# Patient Record
Sex: MALE | Race: WHITE | NOT HISPANIC OR LATINO
[De-identification: names, ages, dates, MRNs, and addresses within clinical notes are randomized per-mention and may not be internally consistent; named-entity substitution may affect disease eponyms.]

---

## 2017-08-16 PROBLEM — Z00.00 ENCOUNTER FOR PREVENTIVE HEALTH EXAMINATION: Status: ACTIVE | Noted: 2017-08-16

## 2017-09-15 ENCOUNTER — APPOINTMENT (OUTPATIENT)
Dept: ENDOCRINOLOGY | Facility: CLINIC | Age: 52
End: 2017-09-15
Payer: COMMERCIAL

## 2017-09-15 VITALS
SYSTOLIC BLOOD PRESSURE: 118 MMHG | BODY MASS INDEX: 33.9 KG/M2 | WEIGHT: 216 LBS | DIASTOLIC BLOOD PRESSURE: 80 MMHG | OXYGEN SATURATION: 98 % | HEIGHT: 67 IN | HEART RATE: 71 BPM

## 2017-09-15 DIAGNOSIS — Z82.49 FAMILY HISTORY OF ISCHEMIC HEART DISEASE AND OTHER DISEASES OF THE CIRCULATORY SYSTEM: ICD-10-CM

## 2017-09-15 DIAGNOSIS — Z87.891 PERSONAL HISTORY OF NICOTINE DEPENDENCE: ICD-10-CM

## 2017-09-15 DIAGNOSIS — Z83.3 FAMILY HISTORY OF DIABETES MELLITUS: ICD-10-CM

## 2017-09-15 PROCEDURE — 99205 OFFICE O/P NEW HI 60 MIN: CPT

## 2017-09-15 RX ORDER — QUINAPRIL HYDROCHLORIDE 40 MG/1
40 TABLET, FILM COATED ORAL
Refills: 0 | Status: DISCONTINUED | COMMUNITY
Start: 2017-02-15 | End: 2017-09-15

## 2017-09-15 RX ORDER — TESTOSTERONE CYPIONATE 200 MG/ML
200 INJECTION, SOLUTION INTRAMUSCULAR
Qty: 6 | Refills: 0 | Status: DISCONTINUED | COMMUNITY
Start: 2017-09-15 | End: 2017-09-15

## 2017-10-26 ENCOUNTER — RESULT REVIEW (OUTPATIENT)
Age: 52
End: 2017-10-26

## 2017-10-27 ENCOUNTER — RESULT REVIEW (OUTPATIENT)
Age: 52
End: 2017-10-27

## 2018-01-12 ENCOUNTER — RX RENEWAL (OUTPATIENT)
Age: 53
End: 2018-01-12

## 2018-01-26 ENCOUNTER — OTHER (OUTPATIENT)
Age: 53
End: 2018-01-26

## 2018-01-26 ENCOUNTER — APPOINTMENT (OUTPATIENT)
Dept: ENDOCRINOLOGY | Facility: CLINIC | Age: 53
End: 2018-01-26
Payer: COMMERCIAL

## 2018-01-26 VITALS
OXYGEN SATURATION: 98 % | WEIGHT: 208 LBS | HEIGHT: 67 IN | DIASTOLIC BLOOD PRESSURE: 72 MMHG | SYSTOLIC BLOOD PRESSURE: 118 MMHG | HEART RATE: 58 BPM | BODY MASS INDEX: 32.65 KG/M2

## 2018-01-26 PROCEDURE — 99214 OFFICE O/P EST MOD 30 MIN: CPT

## 2018-01-30 RX ORDER — TESTOSTERONE ENANTHATE 200 MG/ML
200 INJECTION, SOLUTION INTRAMUSCULAR
Refills: 0 | Status: DISCONTINUED | COMMUNITY
Start: 2017-09-15 | End: 2018-01-30

## 2018-03-05 ENCOUNTER — RX RENEWAL (OUTPATIENT)
Age: 53
End: 2018-03-05

## 2018-08-07 ENCOUNTER — RX RENEWAL (OUTPATIENT)
Age: 53
End: 2018-08-07

## 2018-08-09 ENCOUNTER — RX RENEWAL (OUTPATIENT)
Age: 53
End: 2018-08-09

## 2018-08-10 ENCOUNTER — RX RENEWAL (OUTPATIENT)
Age: 53
End: 2018-08-10

## 2018-08-29 ENCOUNTER — APPOINTMENT (OUTPATIENT)
Dept: ENDOCRINOLOGY | Facility: CLINIC | Age: 53
End: 2018-08-29

## 2018-10-16 ENCOUNTER — OTHER (OUTPATIENT)
Age: 53
End: 2018-10-16

## 2019-01-18 ENCOUNTER — APPOINTMENT (OUTPATIENT)
Dept: ENDOCRINOLOGY | Facility: CLINIC | Age: 54
End: 2019-01-18
Payer: COMMERCIAL

## 2019-01-18 VITALS
HEIGHT: 67 IN | BODY MASS INDEX: 32.65 KG/M2 | HEART RATE: 98 BPM | DIASTOLIC BLOOD PRESSURE: 80 MMHG | SYSTOLIC BLOOD PRESSURE: 120 MMHG | OXYGEN SATURATION: 98 % | WEIGHT: 208 LBS

## 2019-01-18 PROCEDURE — 99214 OFFICE O/P EST MOD 30 MIN: CPT

## 2019-01-18 RX ORDER — ALENDRONATE SODIUM 70 MG/1
70 TABLET ORAL
Qty: 5 | Refills: 4 | Status: DISCONTINUED | COMMUNITY
Start: 2018-01-26 | End: 2019-01-18

## 2019-01-18 RX ORDER — OMEPRAZOLE MAGNESIUM 20 MG/1
20 TABLET, DELAYED RELEASE ORAL
Refills: 0 | Status: DISCONTINUED | COMMUNITY
Start: 2017-09-15 | End: 2019-01-18

## 2019-01-18 RX ORDER — NEBIVOLOL HYDROCHLORIDE 10 MG/1
10 TABLET ORAL DAILY
Refills: 0 | Status: DISCONTINUED | COMMUNITY
Start: 2017-03-22 | End: 2019-01-18

## 2019-01-18 RX ORDER — PREDNISONE 10 MG/1
10 TABLET ORAL
Refills: 0 | Status: DISCONTINUED | COMMUNITY
Start: 2018-01-30 | End: 2019-01-18

## 2019-01-20 NOTE — ASSESSMENT
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 Kallmann syndrome - will request recent labs. .   Will continue replacement therapy of testosterone cypionate 150 mg q 2 weeks and adjust as indicated. PSA 1.1\par \par 2. Osteoporosis - repeat DEXA now. never started fosamax but reports compliance with testosterone therapy. today. recommend calcium 500 mg qd. \par \par 3. Hashimoto's thyroiditis -  Continue stable dose of LT4 100 mcg poqd and adjust as indicated. referred for thyroid US\par \par 4. HTN - continue quinapril \par \par 5. HLD - continue simvastatin. \par \par 6. Prediabetes - diagnosed 10/2017 with a HbA1c 5.9. Lifestyle modification advised. \par \par RV 4 months.

## 2019-01-20 NOTE — DATA REVIEWED
[FreeTextEntry1] : 10/17/17\par Hct 42.9\par eGFR 94\par Cr 0.93\par corrected calcium 9.1\par Tot Chol 184\par \par HDL 40\par Tri 182\par FSH 1.0\par LH 0.6\par Tot Testo 630\par PSA 1.1\par SHBG 45.1\par TPO Abs 27\par TG Ab 3.2\par TSH 1.27\par FT4 1.19\par HbA1c 5.9\par

## 2019-01-20 NOTE — HISTORY OF PRESENT ILLNESS
[FreeTextEntry1] : 52 yo M with Kallmann syndrome, Hashimotos thyroiditis, osteoporosis, HTN, HLD,prediabetes presents for initial evaluation.\par \par Kallmann Syndrome:\par Due to short stature and delayed puberty his brother was evaluated and found to have Kallmann syndrome\par The patient was then taken for evaluation and was diagnosed when 12 yrs old.\par He has been taking testosterone since diagnosis except during desire for reproduction at which time he was reportedly treated with clomid and HCG.\par Genetic testing was reportedly done by repro endo Dr. Bermudez (sp?), who he reports is no longer reachable. The patient can not comment on the results.\par He has one child\par Further clinical manifestations of anosmia, cryptorchidism, cleft palate (multiple surgeries for latter first when was 18 days old)\par He was previously taking IM Testosterone cypionate 200 mg IM q 3 weeks (he reports that he uses his brother's medication).\par Was treated for acute hearing loss of the left ear from approximately 1/5/18 -2/2018. Now resolved\par Evaluated by Dr. Montiel. \par ENT Dr. Heather Marcus [phone]\par He was placed on steroid taper starting with prednisone 100 mg qd started 1/23/18. \par He was changed to testosterone cypionate  mg q 2 weeks on 10/27/17 He was previously non compliant with testosterone replacement - 10 yrs ago there was a time when he did not take it for one year, and there was a time for at least 2 yrs where he took it sporadically.\par DEXA checked after this non-compliance revealed osteoporosis. \par Repeat 9/22/17 DEXA showed osteoporosis of the lumbar spine and osteopenia of the hip. Improvement in BMD noted after 2 years of consistent testosterone therapy however given T score of < -2.5 therapy was recommended. Prolia was not covered by his insurance. He was started on fosamax but never took it. \par prostate exam 10/6/17 by urologist Dr. James Aden (consult reviewed)\par Has never had abdominal imaging to evaluate for renal agenesis. \par \par Osteoporosis\par DEXA as above\par No history of fractures\par Prior smoker 1 ppd x 7 years, quit over 10 yrs ago. \par \par Hashimoto's thyroiditis\par Dx at least 10 yrs ago\par On stable dose of LT4 100 mcg qd.\par No history of thyroid surgery or radiation to the head or neck\par Never told of thyroid nodules\par Mother, father and brother with hypothyroidism\par

## 2019-01-22 ENCOUNTER — OTHER (OUTPATIENT)
Age: 54
End: 2019-01-22

## 2019-01-22 ENCOUNTER — TRANSCRIPTION ENCOUNTER (OUTPATIENT)
Age: 54
End: 2019-01-22

## 2019-01-22 ENCOUNTER — RX RENEWAL (OUTPATIENT)
Age: 54
End: 2019-01-22

## 2019-01-22 DIAGNOSIS — Z82.49 FAMILY HISTORY OF ISCHEMIC HEART DISEASE AND OTHER DISEASES OF THE CIRCULATORY SYSTEM: ICD-10-CM

## 2019-02-09 ENCOUNTER — RESULT REVIEW (OUTPATIENT)
Age: 54
End: 2019-02-09

## 2019-02-16 ENCOUNTER — RESULT REVIEW (OUTPATIENT)
Age: 54
End: 2019-02-16

## 2019-03-18 ENCOUNTER — TRANSCRIPTION ENCOUNTER (OUTPATIENT)
Age: 54
End: 2019-03-18

## 2019-06-12 ENCOUNTER — OTHER (OUTPATIENT)
Age: 54
End: 2019-06-12

## 2019-08-09 ENCOUNTER — RX RENEWAL (OUTPATIENT)
Age: 54
End: 2019-08-09

## 2019-08-15 ENCOUNTER — RESULT REVIEW (OUTPATIENT)
Age: 54
End: 2019-08-15

## 2019-11-22 ENCOUNTER — APPOINTMENT (OUTPATIENT)
Dept: INTERNAL MEDICINE | Facility: CLINIC | Age: 54
End: 2019-11-22
Payer: COMMERCIAL

## 2019-11-22 VITALS
RESPIRATION RATE: 14 BRPM | OXYGEN SATURATION: 97 % | HEART RATE: 102 BPM | HEIGHT: 65.75 IN | WEIGHT: 220.46 LBS | BODY MASS INDEX: 35.86 KG/M2 | SYSTOLIC BLOOD PRESSURE: 110 MMHG | DIASTOLIC BLOOD PRESSURE: 70 MMHG | TEMPERATURE: 98.3 F

## 2019-11-22 DIAGNOSIS — R10.11 RIGHT UPPER QUADRANT PAIN: ICD-10-CM

## 2019-11-22 DIAGNOSIS — R40.0 SOMNOLENCE: ICD-10-CM

## 2019-11-22 PROCEDURE — 99212 OFFICE O/P EST SF 10 MIN: CPT | Mod: 25

## 2019-11-22 PROCEDURE — 99202 OFFICE O/P NEW SF 15 MIN: CPT | Mod: 25

## 2019-11-22 PROCEDURE — G0444 DEPRESSION SCREEN ANNUAL: CPT | Mod: 59

## 2019-11-22 PROCEDURE — 99386 PREV VISIT NEW AGE 40-64: CPT | Mod: 25

## 2019-11-22 NOTE — COUNSELING
[Potential consequences of obesity discussed] : Potential consequences of obesity discussed [Encouraged to maintain food diary] : Encouraged to maintain food diary [Encouraged to increase physical activity] : Encouraged to increase physical activity [Target Wt Loss Goal ___] : Weight Loss Goals: Target weight loss goal [unfilled] lbs

## 2019-11-27 ENCOUNTER — APPOINTMENT (OUTPATIENT)
Dept: ENDOCRINOLOGY | Facility: CLINIC | Age: 54
End: 2019-11-27

## 2019-12-06 ENCOUNTER — APPOINTMENT (OUTPATIENT)
Dept: ENDOCRINOLOGY | Facility: CLINIC | Age: 54
End: 2019-12-06

## 2020-02-21 ENCOUNTER — APPOINTMENT (OUTPATIENT)
Dept: UROLOGY | Facility: CLINIC | Age: 55
End: 2020-02-21
Payer: COMMERCIAL

## 2020-02-21 VITALS — DIASTOLIC BLOOD PRESSURE: 94 MMHG | SYSTOLIC BLOOD PRESSURE: 150 MMHG | HEART RATE: 59 BPM

## 2020-02-21 VITALS — BODY MASS INDEX: 33.15 KG/M2 | WEIGHT: 199 LBS | HEIGHT: 65 IN

## 2020-02-21 PROCEDURE — 99204 OFFICE O/P NEW MOD 45 MIN: CPT

## 2020-02-21 NOTE — HISTORY OF PRESENT ILLNESS
[FreeTextEntry1] : Very pleasant 54-year-old gentleman presents for evaluation of left flank pain and left kidney stone. Patient recently underwent a renal ultrasound for left flank pain which demonstrated mild hydronephrosis secondary to presumed 1 cm kidney stone. He reports persistent flank pain. He has not used pain medication. He denies dysuria. No hematuria. No right flank pain. No pain radiation. No specific timing to his symptoms. No other complaints. [Flank Pain] : flank pain

## 2020-02-21 NOTE — ASSESSMENT
[FreeTextEntry1] : Very pleasant 54-year-old gentleman who presents for evaluation of kidney stones\par -Renal ultrasound images from [facility] radiology reviewed\par -CT scan for definitive diagnosis and surgical planning\par -We discussed different treatment options for a 1 cm kidney stone\par -Urine culture\par -Follow up next week\par -Ibuprofen and Tylenol for pain

## 2020-02-21 NOTE — PHYSICAL EXAM
[General Appearance - Well Developed] : well developed [General Appearance - Well Nourished] : well nourished [Well Groomed] : well groomed [Normal Appearance] : normal appearance [General Appearance - In No Acute Distress] : no acute distress [Edema] : no peripheral edema [Respiration, Rhythm And Depth] : normal respiratory rhythm and effort [Exaggerated Use Of Accessory Muscles For Inspiration] : no accessory muscle use [Abdomen Soft] : soft [Abdomen Tenderness] : non-tender [Urethral Meatus] : meatus normal [Costovertebral Angle Tenderness] : no ~M costovertebral angle tenderness [Scrotum] : the scrotum was normal [Urinary Bladder Findings] : the bladder was normal on palpation [No Prostate Nodules] : no prostate nodules [Testes Mass (___cm)] : there were no testicular masses [] : no rash [Normal Station and Gait] : the gait and station were normal for the patient's age [No Focal Deficits] : no focal deficits [Oriented To Time, Place, And Person] : oriented to person, place, and time [Affect] : the affect was normal [Mood] : the mood was normal [Not Anxious] : not anxious [No Palpable Adenopathy] : no palpable adenopathy

## 2020-02-24 LAB — BACTERIA UR CULT: NORMAL

## 2020-02-25 ENCOUNTER — APPOINTMENT (OUTPATIENT)
Dept: UROLOGY | Facility: CLINIC | Age: 55
End: 2020-02-25
Payer: COMMERCIAL

## 2020-02-25 ENCOUNTER — APPOINTMENT (OUTPATIENT)
Dept: INTERNAL MEDICINE | Facility: CLINIC | Age: 55
End: 2020-02-25
Payer: COMMERCIAL

## 2020-02-25 ENCOUNTER — NON-APPOINTMENT (OUTPATIENT)
Age: 55
End: 2020-02-25

## 2020-02-25 VITALS
WEIGHT: 199 LBS | RESPIRATION RATE: 15 BRPM | HEIGHT: 65 IN | HEART RATE: 75 BPM | BODY MASS INDEX: 33.15 KG/M2 | SYSTOLIC BLOOD PRESSURE: 138 MMHG | DIASTOLIC BLOOD PRESSURE: 89 MMHG

## 2020-02-25 VITALS
TEMPERATURE: 98.5 F | HEART RATE: 99 BPM | WEIGHT: 199.3 LBS | BODY MASS INDEX: 32.03 KG/M2 | OXYGEN SATURATION: 97 % | DIASTOLIC BLOOD PRESSURE: 82 MMHG | HEIGHT: 66 IN | SYSTOLIC BLOOD PRESSURE: 125 MMHG

## 2020-02-25 DIAGNOSIS — N40.0 BENIGN PROSTATIC HYPERPLASIA WITHOUT LOWER URINARY TRACT SYMPMS: ICD-10-CM

## 2020-02-25 DIAGNOSIS — Z01.818 ENCOUNTER FOR OTHER PREPROCEDURAL EXAMINATION: ICD-10-CM

## 2020-02-25 PROCEDURE — 36415 COLL VENOUS BLD VENIPUNCTURE: CPT

## 2020-02-25 PROCEDURE — 99214 OFFICE O/P EST MOD 30 MIN: CPT

## 2020-02-25 PROCEDURE — 99214 OFFICE O/P EST MOD 30 MIN: CPT | Mod: 25

## 2020-02-25 NOTE — PHYSICAL EXAM
[General Appearance - Well Developed] : well developed [General Appearance - Well Nourished] : well nourished [Normal Appearance] : normal appearance [Well Groomed] : well groomed [General Appearance - In No Acute Distress] : no acute distress [Abdomen Tenderness] : non-tender [Abdomen Soft] : soft [FreeTextEntry1] : left flank pain [Urinary Bladder Findings] : the bladder was normal on palpation [Edema] : no peripheral edema [Respiration, Rhythm And Depth] : normal respiratory rhythm and effort [] : no respiratory distress [Exaggerated Use Of Accessory Muscles For Inspiration] : no accessory muscle use [Oriented To Time, Place, And Person] : oriented to person, place, and time [Affect] : the affect was normal [Mood] : the mood was normal [Normal Station and Gait] : the gait and station were normal for the patient's age [No Focal Deficits] : no focal deficits [Not Anxious] : not anxious [No Palpable Adenopathy] : no palpable adenopathy

## 2020-02-25 NOTE — ASSESSMENT
[FreeTextEntry1] : Very pleasant 54-year-old gentleman who presents for followup of left flank pain, new finding of left intrarenal stone on CT scan\par -Urine culture negative\par -I discussed the different treatment modalities for nephrolithiasis with the patient, including medical management, spontaneous stone passage, percutaneous stone extraction, extracorporeal shock wave lithotripsy, and ureteroscopy with laser lithotripsy and stone extraction. Given the size and location of the stone, the patient opted to proceed with ureteroscopy.  The risks, benefits, and alternatives to ureteroscopy were discussed with the patient, including but not limited to pain, infection, bleeding, bladder injury, ureteral injury, renal injury, and treatment failure.  I also discussed the possible need for a temporary ureteral stent.  I discussed the possible side effects of a temporary ureteral stent, including flank pain, hematuria, and bladder spasms.  The patient understands that a stent is a temporary implant that must be removed in the future.  The patient wishes to proceed and we will schedule the surgery for the near future.\par -Medical clearance

## 2020-02-25 NOTE — HISTORY OF PRESENT ILLNESS
[FreeTextEntry1] : Very pleasant 54-year-old gentleman who presents for followup of left kidney stone and left flank pain. Patient recently underwent CT scan at [facility] radiology which demonstrated an approximately 1.1 cm left intrarenal stone with mild hydronephrosis. He still reports left flank pain. He reports taking children's Tylenol recently which significantly improved his pain. He denies dysuria. No hematuria. No right flank pain. No pain radiation. No other complaints. [Flank Pain] : flank pain

## 2020-02-27 NOTE — ASSESSMENT
[FreeTextEntry1] : 1) Annual Physical: Patient UTD with regular eye care and also dentist. Discussed healthy eating with patient and limiting calories, continue with exercise\par 2) Daytime somnolence: suspect sleep apnea given history, advised weight loss and rto in 3 months. If no improvement to have sleep study\par 3) HTN: stable, continue current management\par 4) HLD: awaiting results from endocrinologist, to have results sent over\par 5) BPH: awaiting results from endocrinologist, check PSA as patient is on TRT\par 6) RUQ abdominal pain: will await results of liver function done by endocrinologist. check abdominal U/S\par 7) Vitamin D insufficiency: check levels, awaiting results from endocrinologist

## 2020-02-27 NOTE — HISTORY OF PRESENT ILLNESS
[FreeTextEntry1] : 54 year old male presents for annual physical and to establish care [de-identified] : 40-year-old male with past medical history significant for kallman syndrome, low testosterone hypothyroidism hypertension hyperlipidemia presents to establish care. Patient has seen a cardiologist the last 2 years denies any cardiac symptoms including chest pain chest tightness shortness of breath. Patient walks constantly daily denies any claudication like symptoms or any chest pain. Patient does feel un refreshed from sleep and also does feel daytime somnolence. Patient does have witnessed snoring episodes. Patient denies any urinary incontinence. Patient denies any hesitancy or any obstructive urinary symptoms. Last colonoscopy done this year. Left ear fullness , treated with triamterene diagnosed with endolymphic hydrops.

## 2020-02-27 NOTE — PHYSICAL EXAM
[Prostate Enlarged] : was enlarged [Normal] : affect was normal and insight and judgment were intact [Prostate Nodule] : did not have a nodule [Prostate Tenderness] : was not tender [de-identified] : RUQ tenderness, ventura's sign negative

## 2020-02-28 ENCOUNTER — OUTPATIENT (OUTPATIENT)
Dept: OUTPATIENT SERVICES | Facility: HOSPITAL | Age: 55
LOS: 1 days | End: 2020-02-28
Payer: COMMERCIAL

## 2020-02-28 VITALS
DIASTOLIC BLOOD PRESSURE: 86 MMHG | TEMPERATURE: 98 F | SYSTOLIC BLOOD PRESSURE: 136 MMHG | OXYGEN SATURATION: 99 % | WEIGHT: 199.08 LBS | RESPIRATION RATE: 18 BRPM | HEIGHT: 66 IN | HEART RATE: 67 BPM

## 2020-02-28 DIAGNOSIS — N20.0 CALCULUS OF KIDNEY: ICD-10-CM

## 2020-02-28 DIAGNOSIS — Z87.730 PERSONAL HISTORY OF (CORRECTED) CLEFT LIP AND PALATE: Chronic | ICD-10-CM

## 2020-02-28 DIAGNOSIS — I10 ESSENTIAL (PRIMARY) HYPERTENSION: ICD-10-CM

## 2020-02-28 DIAGNOSIS — Z01.818 ENCOUNTER FOR OTHER PREPROCEDURAL EXAMINATION: ICD-10-CM

## 2020-02-28 DIAGNOSIS — E06.3 AUTOIMMUNE THYROIDITIS: ICD-10-CM

## 2020-02-28 DIAGNOSIS — Z98.890 OTHER SPECIFIED POSTPROCEDURAL STATES: Chronic | ICD-10-CM

## 2020-02-28 DIAGNOSIS — E78.5 HYPERLIPIDEMIA, UNSPECIFIED: ICD-10-CM

## 2020-02-28 LAB
ALBUMIN SERPL ELPH-MCNC: 4.3 G/DL
ALP BLD-CCNC: 78 U/L
ALT SERPL-CCNC: 16 U/L
ANION GAP SERPL CALC-SCNC: 17 MMOL/L
APPEARANCE UR: CLEAR — SIGNIFICANT CHANGE UP
APTT BLD: 28.6 SEC
AST SERPL-CCNC: 19 U/L
BACTERIA # UR AUTO: ABNORMAL /HPF
BASOPHILS # BLD AUTO: 0.04 K/UL
BASOPHILS NFR BLD AUTO: 0.5 %
BILIRUB SERPL-MCNC: 0.3 MG/DL
BILIRUB UR-MCNC: NEGATIVE — SIGNIFICANT CHANGE UP
BUN SERPL-MCNC: 20 MG/DL
CALCIUM SERPL-MCNC: 9.6 MG/DL
CHLORIDE SERPL-SCNC: 100 MMOL/L
CO2 SERPL-SCNC: 23 MMOL/L
COLOR SPEC: YELLOW — SIGNIFICANT CHANGE UP
COMMENT - URINE: SIGNIFICANT CHANGE UP
CREAT SERPL-MCNC: 1.04 MG/DL
DIFF PNL FLD: ABNORMAL
EOSINOPHIL # BLD AUTO: 0.11 K/UL
EOSINOPHIL NFR BLD AUTO: 1.4 %
EPI CELLS # UR: SIGNIFICANT CHANGE UP /HPF
ESTIMATED AVERAGE GLUCOSE: 137 MG/DL
GLUCOSE SERPL-MCNC: 128 MG/DL
GLUCOSE UR QL: NEGATIVE — SIGNIFICANT CHANGE UP
HBA1C MFR BLD HPLC: 6.4 %
HCT VFR BLD CALC: 41.6 %
HGB BLD-MCNC: 14.3 G/DL
HYALINE CASTS # UR AUTO: ABNORMAL /LPF
IMM GRANULOCYTES NFR BLD AUTO: 0.6 %
INR PPP: 0.97 RATIO
KETONES UR-MCNC: NEGATIVE — SIGNIFICANT CHANGE UP
LEUKOCYTE ESTERASE UR-ACNC: NEGATIVE — SIGNIFICANT CHANGE UP
LYMPHOCYTES # BLD AUTO: 1.98 K/UL
LYMPHOCYTES NFR BLD AUTO: 24.9 %
MAN DIFF?: NORMAL
MCHC RBC-ENTMCNC: 30.4 PG
MCHC RBC-ENTMCNC: 34.4 GM/DL
MCV RBC AUTO: 88.5 FL
MONOCYTES # BLD AUTO: 0.6 K/UL
MONOCYTES NFR BLD AUTO: 7.6 %
NEUTROPHILS # BLD AUTO: 5.16 K/UL
NEUTROPHILS NFR BLD AUTO: 65 %
NITRITE UR-MCNC: NEGATIVE — SIGNIFICANT CHANGE UP
PH UR: 5 — SIGNIFICANT CHANGE UP (ref 5–8)
PLATELET # BLD AUTO: 302 K/UL
POTASSIUM SERPL-SCNC: 3.8 MMOL/L
PROT SERPL-MCNC: 7.5 G/DL
PROT UR-MCNC: 30 MG/DL
PT BLD: 11 SEC
RBC # BLD: 4.7 M/UL
RBC # FLD: 11.9 %
RBC CASTS # UR COMP ASSIST: ABNORMAL /HPF (ref 0–2)
SODIUM SERPL-SCNC: 141 MMOL/L
SP GR SPEC: 1.02 — SIGNIFICANT CHANGE UP (ref 1.01–1.02)
TSH SERPL-ACNC: 0.87 UIU/ML
UROBILINOGEN FLD QL: NEGATIVE — SIGNIFICANT CHANGE UP
WBC # FLD AUTO: 7.94 K/UL
WBC UR QL: SIGNIFICANT CHANGE UP /HPF (ref 0–5)

## 2020-02-28 PROCEDURE — G0463: CPT

## 2020-02-28 NOTE — H&P PST ADULT - RESPIRATORY
FOLLOW UP:   - Monthly   RN visit in 1 month   PA in 2 months   Dr Lee in 3 months     LABS:     - next week - if stable then every other week    MEDICATIONS:   - Stop Cellcept (Mycophenolate)   - increase Prograf (tacrolimus) to 4 mg twice daily   - take Protonix early in the morning - may take up to twice daily if needed    MISCELLANEOUS:   - try to increase activity slowly    
detailed exam

## 2020-02-28 NOTE — HISTORY OF PRESENT ILLNESS
[No Pertinent Cardiac History] : no history of aortic stenosis, atrial fibrillation, coronary artery disease, recent myocardial infarction, or implantable device/pacemaker [No Pertinent Pulmonary History] : no history of asthma, COPD, sleep apnea, or smoking [No Adverse Anesthesia Reaction] : no adverse anesthesia reaction in self or family member [Chronic Anticoagulation] : no chronic anticoagulation [Chronic Kidney Disease] : no chronic kidney disease [Diabetes] : no diabetes [FreeTextEntry1] : ureteroscopy [FreeTextEntry3] : Dr. Barraza [FreeTextEntry4] : 54-year-old male presents for preoperative clearance for ureteroscopy. Patient overall feels well denies any chest pain chest tightness shortness of breath. Is able to walk up two flights of stairs without getting shortness of breath. Denies any history of sleep apnea or asthma. No significant bleeding history.

## 2020-02-28 NOTE — H&P PST ADULT - NSICDXPROBLEM_GEN_ALL_CORE_FT
PROBLEM DIAGNOSES  Problem: Calculus of kidney  Assessment and Plan: Cystoscopy, left ureteroscopy, laser lithotripsy with stone extraction on 03/05/2020. Preoperative instructions discussed and given to pt. Verbalized understanding of instructions given.     Problem: HTN (hypertension)  Assessment and Plan: Instructed to continue quinapril and to take it with sips of water on day of surgery.  Follow-up with PCP postop for management.     Problem: HLD (hyperlipidemia)  Assessment and Plan: Instructed pt to continue simvavastatin and to follow-up with PCP postop for lipid management     Problem: Hashimoto's thyroiditis  Assessment and Plan: Instructed to continue Synthroid and take with sips of water on day of surgery. Follow-up with provider postop for management.

## 2020-02-28 NOTE — H&P PST ADULT - RS GEN PE MLT RESP DETAILS PC
no wheezes/respirations non-labored/no subcutaneous emphysema/breath sounds equal/normal/good air movement/no chest wall tenderness/no rhonchi/no intercostal retractions/no rales/airway patent/clear to auscultation bilaterally

## 2020-02-28 NOTE — H&P PST ADULT - NSICDXPASTMEDICALHX_GEN_ALL_CORE_FT
PAST MEDICAL HISTORY:  Calculus of kidney     Hashimoto's thyroiditis     HLD (hyperlipidemia)     HTN (hypertension)     Kallmann's syndrome

## 2020-02-28 NOTE — ASSESSMENT
[High Risk Surgery - Intraperitoneal, Intrathoracic or Supringuinal Vascular Procedures] : High Risk Surgery - Intraperitoneal, Intrathoracic or Supringuinal Vascular Procedures - No (0) [Ischemic Heart Disease] : Ischemic Heart Disease - No (0) [Congestive Heart Failure] : Congestive Heart Failure - No (0) [Prior Cerebrovascular Accident or TIA] : Prior Cerebrovascular Accident or TIA - No (0) [Creatinine >= 2mg/dL (1 Point)] : Creatinine >= 2mg/dL - No (0) [Insulin-dependent Diabetic (1 Point)] : Insulin-dependent Diabetic - No (0) [Patient Optimized for Surgery] : Patient optimized for surgery

## 2020-02-28 NOTE — H&P PST ADULT - ASSESSMENT
55 yr old male with PMH of HTN, HLD, Hashimoto's thyroiditis, Kallman's syndrome presents with calculus of kidney. Pt for cystoscopy, left ureteroscopy, laser lithotripsy with stone extraction on 03/05/2020.

## 2020-02-28 NOTE — H&P PST ADULT - NSICDXFAMILYHX_GEN_ALL_CORE_FT
FAMILY HISTORY:  Family history of heart disease, parents  Family history of hyperlipidemia, siblings  Family history of hypertension, siblings  Family history of liver disease, mother  Family history of lung disease, grandparent

## 2020-02-28 NOTE — H&P PST ADULT - NEGATIVE CARDIOVASCULAR SYMPTOMS
no paroxysmal nocturnal dyspnea/no chest pain/no dyspnea on exertion/no orthopnea/no palpitations/no peripheral edema/no claudication

## 2020-02-28 NOTE — H&P PST ADULT - HISTORY OF PRESENT ILLNESS
55 yr old male with no PMH presents with c/o lower abdominal quadrant pain and flank pain due to renal stone. Denies any nausea, hematuria, dysuria. Pt for cystoscopy, ureteroscopy with laser lithotripsy 55 yr old male with PMH of HTN, HLD, Hashimoto's thyroiditis, Kallmann's syndrome presents with c/o dysuria and pressure in lower in lower back due to renal stone. Denies any nausea, hematuria, dysuria. Pt for cystoscopy, left ureteroscopy, laser lithotripsy with stone extraction on 03/05/2020.

## 2020-02-28 NOTE — PHYSICAL EXAM
[Normal] : affect was normal and insight and judgment were intact [de-identified] : left sided abdominal discomfort

## 2020-03-04 ENCOUNTER — TRANSCRIPTION ENCOUNTER (OUTPATIENT)
Age: 55
End: 2020-03-04

## 2020-03-05 ENCOUNTER — RESULT REVIEW (OUTPATIENT)
Age: 55
End: 2020-03-05

## 2020-03-05 ENCOUNTER — OUTPATIENT (OUTPATIENT)
Dept: OUTPATIENT SERVICES | Facility: HOSPITAL | Age: 55
LOS: 1 days | End: 2020-03-05
Payer: COMMERCIAL

## 2020-03-05 ENCOUNTER — APPOINTMENT (OUTPATIENT)
Dept: UROLOGY | Facility: HOSPITAL | Age: 55
End: 2020-03-05

## 2020-03-05 VITALS
HEIGHT: 66 IN | WEIGHT: 199.08 LBS | DIASTOLIC BLOOD PRESSURE: 60 MMHG | TEMPERATURE: 98 F | RESPIRATION RATE: 18 BRPM | HEART RATE: 91 BPM | SYSTOLIC BLOOD PRESSURE: 102 MMHG | OXYGEN SATURATION: 98 %

## 2020-03-05 VITALS
TEMPERATURE: 98 F | HEART RATE: 83 BPM | SYSTOLIC BLOOD PRESSURE: 129 MMHG | RESPIRATION RATE: 16 BRPM | OXYGEN SATURATION: 94 % | DIASTOLIC BLOOD PRESSURE: 72 MMHG

## 2020-03-05 DIAGNOSIS — Z87.730 PERSONAL HISTORY OF (CORRECTED) CLEFT LIP AND PALATE: Chronic | ICD-10-CM

## 2020-03-05 DIAGNOSIS — N20.0 CALCULUS OF KIDNEY: ICD-10-CM

## 2020-03-05 DIAGNOSIS — Z98.890 OTHER SPECIFIED POSTPROCEDURAL STATES: Chronic | ICD-10-CM

## 2020-03-05 PROCEDURE — 74420 UROGRAPHY RTRGR +-KUB: CPT | Mod: 26

## 2020-03-05 PROCEDURE — 82365 CALCULUS SPECTROSCOPY: CPT

## 2020-03-05 PROCEDURE — 76000 FLUOROSCOPY <1 HR PHYS/QHP: CPT

## 2020-03-05 PROCEDURE — 88307 TISSUE EXAM BY PATHOLOGIST: CPT | Mod: 26

## 2020-03-05 PROCEDURE — 52356 CYSTO/URETERO W/LITHOTRIPSY: CPT | Mod: LT

## 2020-03-05 PROCEDURE — C1758: CPT

## 2020-03-05 PROCEDURE — V2632: CPT

## 2020-03-05 PROCEDURE — C1769: CPT

## 2020-03-05 PROCEDURE — C2625: CPT

## 2020-03-05 PROCEDURE — C1889: CPT

## 2020-03-05 PROCEDURE — 88307 TISSUE EXAM BY PATHOLOGIST: CPT

## 2020-03-05 RX ORDER — ACETAMINOPHEN 500 MG
1000 TABLET ORAL ONCE
Refills: 0 | Status: DISCONTINUED | OUTPATIENT
Start: 2020-03-05 | End: 2020-03-13

## 2020-03-05 RX ORDER — KETOROLAC TROMETHAMINE 30 MG/ML
15 SYRINGE (ML) INJECTION ONCE
Refills: 0 | Status: DISCONTINUED | OUTPATIENT
Start: 2020-03-05 | End: 2020-03-05

## 2020-03-05 RX ORDER — SODIUM CHLORIDE 9 MG/ML
1000 INJECTION, SOLUTION INTRAVENOUS
Refills: 0 | Status: DISCONTINUED | OUTPATIENT
Start: 2020-03-05 | End: 2020-03-05

## 2020-03-05 RX ORDER — AZTREONAM 2 G
1 VIAL (EA) INJECTION
Qty: 6 | Refills: 0
Start: 2020-03-05 | End: 2020-03-07

## 2020-03-05 RX ORDER — FENTANYL CITRATE 50 UG/ML
25 INJECTION INTRAVENOUS
Refills: 0 | Status: DISCONTINUED | OUTPATIENT
Start: 2020-03-05 | End: 2020-03-05

## 2020-03-05 RX ORDER — PHENAZOPYRIDINE HCL 100 MG
1 TABLET ORAL
Qty: 9 | Refills: 0
Start: 2020-03-05

## 2020-03-05 RX ORDER — SIMVASTATIN 20 MG/1
1 TABLET, FILM COATED ORAL
Qty: 0 | Refills: 0 | DISCHARGE

## 2020-03-05 RX ORDER — LEVOTHYROXINE SODIUM 125 MCG
1 TABLET ORAL
Qty: 0 | Refills: 0 | DISCHARGE

## 2020-03-05 RX ORDER — MORPHINE SULFATE 50 MG/1
4 CAPSULE, EXTENDED RELEASE ORAL
Refills: 0 | Status: DISCONTINUED | OUTPATIENT
Start: 2020-03-05 | End: 2020-03-05

## 2020-03-05 RX ORDER — SODIUM CHLORIDE 9 MG/ML
3 INJECTION INTRAMUSCULAR; INTRAVENOUS; SUBCUTANEOUS EVERY 8 HOURS
Refills: 0 | Status: DISCONTINUED | OUTPATIENT
Start: 2020-03-05 | End: 2020-03-05

## 2020-03-05 RX ORDER — QUINAPRIL HYDROCHLORIDE 40 MG/1
1 TABLET, FILM COATED ORAL
Qty: 0 | Refills: 0 | DISCHARGE

## 2020-03-05 RX ADMIN — Medication 15 MILLIGRAM(S): at 12:59

## 2020-03-05 RX ADMIN — Medication 15 MILLIGRAM(S): at 13:19

## 2020-03-05 NOTE — ASU DISCHARGE PLAN (ADULT/PEDIATRIC) - PROCEDURE
Cystoscopy, retrograde pyelogram, left ureteroscopy with laser lithotripsy, stone extraction and stent placement

## 2020-03-05 NOTE — ASU PATIENT PROFILE, ADULT - NSCAFFEAMTFREQ_GEN_ALL_CORE_SD
Moe states she called a couple times to talk with Dr. Prather. Call back number verifyed. Please call    1-2 cups/cans per day

## 2020-03-05 NOTE — BRIEF OPERATIVE NOTE - NSICDXBRIEFPROCEDURE_GEN_ALL_CORE_FT
PROCEDURES:  Cystoscopy, w retrograde pyelogram, ureteroscopy, urinary calculus laser lithotripsy, + stent insert 05-Mar-2020 12:21:19  Sydnee Valdez

## 2020-03-05 NOTE — ASU PREOP CHECKLIST - PATIENT SENT TO
- Likely related to ARABELLA   - Robles in place    - Consider recheck VINCE in 1-2 days   operating room

## 2020-03-05 NOTE — ASU DISCHARGE PLAN (ADULT/PEDIATRIC) - ASU DC SPECIAL INSTRUCTIONSFT
- may experience continued burning sensation and bleeding with urination, will resolve in a few days  - take pyridium for urinary pain, and over the counter pain medications, ie. tylenol or ibuprofen, as needed  - take antibiotics as instructed   - if increasing lower abdominal pain associated with inability to urinate, prolonged blood in urine associated with dizziness, or fever/chills, seek medical care immediately  - follow up with surgeon in 2 weeks, call to schedule/confirm an appointment - may experience continued burning sensation and bleeding with urination, will resolve in a few days  - take pyridium for urinary pain, and over the counter pain medications, ie. Tylenol or ibuprofen, as needed  - take antibiotics as instructed   - if increasing lower abdominal pain associated with inability to urinate, prolonged blood in urine associated with dizziness, or fever/chills, seek medical care immediately  - follow up with surgeon in 2 weeks, call to schedule/confirm an appointment

## 2020-03-05 NOTE — ASU DISCHARGE PLAN (ADULT/PEDIATRIC) - CARE PROVIDER_API CALL
Ezra Barraza (MD)  Urology  [address], [address] [place] [address]  Phone: [phone]  Fax: [phone]  Follow Up Time: 2 weeks

## 2020-03-09 ENCOUNTER — APPOINTMENT (OUTPATIENT)
Dept: INTERNAL MEDICINE | Facility: CLINIC | Age: 55
End: 2020-03-09

## 2020-03-10 PROBLEM — E06.3 AUTOIMMUNE THYROIDITIS: Chronic | Status: ACTIVE | Noted: 2020-02-28

## 2020-03-10 PROBLEM — I10 ESSENTIAL (PRIMARY) HYPERTENSION: Chronic | Status: ACTIVE | Noted: 2020-02-28

## 2020-03-10 PROBLEM — N20.0 CALCULUS OF KIDNEY: Chronic | Status: ACTIVE | Noted: 2020-02-28

## 2020-03-10 PROBLEM — E78.5 HYPERLIPIDEMIA, UNSPECIFIED: Chronic | Status: ACTIVE | Noted: 2020-02-28

## 2020-03-10 PROBLEM — E23.0 HYPOPITUITARISM: Chronic | Status: ACTIVE | Noted: 2020-02-28

## 2020-03-18 ENCOUNTER — APPOINTMENT (OUTPATIENT)
Dept: UROLOGY | Facility: CLINIC | Age: 55
End: 2020-03-18
Payer: COMMERCIAL

## 2020-03-18 PROCEDURE — 52310 CYSTOSCOPY AND TREATMENT: CPT

## 2020-03-22 NOTE — HISTORY OF PRESENT ILLNESS
[FreeTextEntry1] : Patient complaining of intermittent pain in the left groin after stent removal Wednesday and was wondering if it was an infection that needed antibiotics prescribed.  Denies fevers, chills, sweats, N/V, dysuria, hematuria.  I advised him to drink fluids and to take ibuprofen as needed for pain control since he was likely undergoing ureteral spasms from stent removal.\par \par Of note that patient expressed strong disapproval for not having received timely callbacks.  I addressed his concerns and answered all of his questions to the best of my ability.

## 2020-06-03 ENCOUNTER — RX RENEWAL (OUTPATIENT)
Age: 55
End: 2020-06-03

## 2020-06-19 ENCOUNTER — APPOINTMENT (OUTPATIENT)
Dept: UROLOGY | Facility: CLINIC | Age: 55
End: 2020-06-19

## 2020-07-06 ENCOUNTER — APPOINTMENT (OUTPATIENT)
Dept: INTERNAL MEDICINE | Facility: CLINIC | Age: 55
End: 2020-07-06
Payer: COMMERCIAL

## 2020-07-06 ENCOUNTER — LABORATORY RESULT (OUTPATIENT)
Age: 55
End: 2020-07-06

## 2020-07-06 VITALS
TEMPERATURE: 98.2 F | SYSTOLIC BLOOD PRESSURE: 134 MMHG | WEIGHT: 213 LBS | OXYGEN SATURATION: 98 % | BODY MASS INDEX: 34.23 KG/M2 | HEIGHT: 66 IN | HEART RATE: 74 BPM | RESPIRATION RATE: 16 BRPM | DIASTOLIC BLOOD PRESSURE: 82 MMHG

## 2020-07-06 DIAGNOSIS — R07.89 OTHER CHEST PAIN: ICD-10-CM

## 2020-07-06 PROCEDURE — 99214 OFFICE O/P EST MOD 30 MIN: CPT | Mod: 25

## 2020-07-06 PROCEDURE — 36415 COLL VENOUS BLD VENIPUNCTURE: CPT

## 2020-07-09 LAB
25(OH)D3 SERPL-MCNC: 28.8 NG/ML
APPEARANCE: CLEAR
BASOPHILS # BLD AUTO: 0.04 K/UL
BASOPHILS NFR BLD AUTO: 0.6 %
BILIRUBIN URINE: NEGATIVE
BLOOD URINE: NEGATIVE
CHOLEST SERPL-MCNC: 195 MG/DL
CHOLEST/HDLC SERPL: 4.4 RATIO
COLOR: NORMAL
EOSINOPHIL # BLD AUTO: 0.07 K/UL
EOSINOPHIL NFR BLD AUTO: 1.1 %
ESTIMATED AVERAGE GLUCOSE: 123 MG/DL
GLUCOSE QUALITATIVE U: NEGATIVE
HBA1C MFR BLD HPLC: 5.9 %
HCT VFR BLD CALC: 45.4 %
HDLC SERPL-MCNC: 44 MG/DL
HGB BLD-MCNC: 15.1 G/DL
IMM GRANULOCYTES NFR BLD AUTO: 0.3 %
KETONES URINE: NEGATIVE
LDLC SERPL CALC-MCNC: 112 MG/DL
LEUKOCYTE ESTERASE URINE: NEGATIVE
LYMPHOCYTES # BLD AUTO: 1.68 K/UL
LYMPHOCYTES NFR BLD AUTO: 26.8 %
MAN DIFF?: NORMAL
MCHC RBC-ENTMCNC: 30.7 PG
MCHC RBC-ENTMCNC: 33.3 GM/DL
MCV RBC AUTO: 92.3 FL
MONOCYTES # BLD AUTO: 0.46 K/UL
MONOCYTES NFR BLD AUTO: 7.3 %
NEUTROPHILS # BLD AUTO: 4 K/UL
NEUTROPHILS NFR BLD AUTO: 63.9 %
NITRITE URINE: NEGATIVE
PH URINE: 6
PLATELET # BLD AUTO: 271 K/UL
PROTEIN URINE: NEGATIVE
RBC # BLD: 4.92 M/UL
RBC # FLD: 11.9 %
SPECIFIC GRAVITY URINE: 1.01
TRIGL SERPL-MCNC: 195 MG/DL
TSH SERPL-ACNC: 1.13 UIU/ML
UROBILINOGEN URINE: NORMAL
WBC # FLD AUTO: 6.27 K/UL

## 2020-07-09 NOTE — HISTORY OF PRESENT ILLNESS
[FreeTextEntry1] : Patient presents for followup for prediabetes and hypertension also had an episode of chest pain [de-identified] : Chest pain a few days ago patient leaned over and felt a tightness across his chest it was in the upper chest. Went to urgent care had an EKG and was recommended to followup with cardiology. Symptoms lasted until later that night and exacerbated by movement. Patient states he has never had similar symptoms; denies any shortness of breath on exertion, radiating symptoms diaphoresis or nausea. Denies any chest pain related to food. Walks and exercises daily denies any symptoms of claudication or chest tightness.\par Prediabetes: Has gained some weight due to to current quarantine, planning on watching diet and increasing physical activity.

## 2020-07-09 NOTE — ASSESSMENT
[FreeTextEntry1] : #1 chest pain suspect muscular etiology given that symptoms are exacerbated by movement patient does exert himself daily and has no symptoms consistent with angina. EKG showed no a stigmata of chronic hypertension or ST segment abnormalities. Followup with cardiology. Advised to call office if symptoms recur or worsen. Can continue with exercise program slowly increasing intensity.\par # 2 Hypertension: Stable continue current management\par # 3Hyperlipidemia: Patient will exercises daily and will walk watch diet check lipid panel today\par #4 Prediabetes: Check hemoglobin A1c advise 150 minutes of moderate activity and low-carb diet\par rto in 6 months

## 2020-07-17 LAB
ALBUMIN SERPL ELPH-MCNC: 4.6 G/DL
ALP BLD-CCNC: 77 U/L
ALT SERPL-CCNC: 20 U/L
ANION GAP SERPL CALC-SCNC: 15 MMOL/L
APPEARANCE: CLEAR
AST SERPL-CCNC: 21 U/L
BACTERIA: NEGATIVE
BILIRUB SERPL-MCNC: 0.4 MG/DL
BILIRUBIN URINE: NEGATIVE
BLOOD URINE: NEGATIVE
BUN SERPL-MCNC: 13 MG/DL
CALCIUM SERPL-MCNC: 9.4 MG/DL
CHLORIDE SERPL-SCNC: 100 MMOL/L
CO2 SERPL-SCNC: 22 MMOL/L
COLOR: NORMAL
CREAT SERPL-MCNC: 1.02 MG/DL
GLUCOSE QUALITATIVE U: NEGATIVE
GLUCOSE SERPL-MCNC: 94 MG/DL
HYALINE CASTS: 0 /LPF
KETONES URINE: NEGATIVE
LEUKOCYTE ESTERASE URINE: NEGATIVE
MICROSCOPIC-UA: NORMAL
NITRITE URINE: NEGATIVE
PH URINE: 6
POTASSIUM SERPL-SCNC: 4.5 MMOL/L
PROT SERPL-MCNC: 7.4 G/DL
PROTEIN URINE: NEGATIVE
RED BLOOD CELLS URINE: 2 /HPF
SODIUM SERPL-SCNC: 138 MMOL/L
SPECIFIC GRAVITY URINE: 1.01
SQUAMOUS EPITHELIAL CELLS: 0 /HPF
UROBILINOGEN URINE: NORMAL
WHITE BLOOD CELLS URINE: 0 /HPF

## 2020-07-28 ENCOUNTER — RX RENEWAL (OUTPATIENT)
Age: 55
End: 2020-07-28

## 2020-09-01 ENCOUNTER — OUTPATIENT (OUTPATIENT)
Dept: OUTPATIENT SERVICES | Facility: HOSPITAL | Age: 55
LOS: 1 days | End: 2020-09-01
Payer: COMMERCIAL

## 2020-09-01 ENCOUNTER — APPOINTMENT (OUTPATIENT)
Dept: CARDIOLOGY | Facility: CLINIC | Age: 55
End: 2020-09-01

## 2020-09-01 DIAGNOSIS — Z87.730 PERSONAL HISTORY OF (CORRECTED) CLEFT LIP AND PALATE: Chronic | ICD-10-CM

## 2020-09-01 DIAGNOSIS — Z98.890 OTHER SPECIFIED POSTPROCEDURAL STATES: Chronic | ICD-10-CM

## 2020-09-01 DIAGNOSIS — E78.5 HYPERLIPIDEMIA, UNSPECIFIED: ICD-10-CM

## 2020-09-01 DIAGNOSIS — I10 ESSENTIAL (PRIMARY) HYPERTENSION: ICD-10-CM

## 2020-09-01 DIAGNOSIS — R06.00 DYSPNEA, UNSPECIFIED: ICD-10-CM

## 2020-09-01 DIAGNOSIS — R73.03 PREDIABETES: ICD-10-CM

## 2020-09-01 PROCEDURE — 75574 CT ANGIO HRT W/3D IMAGE: CPT

## 2020-09-01 PROCEDURE — 75574 CT ANGIO HRT W/3D IMAGE: CPT | Mod: 26

## 2020-09-15 ENCOUNTER — RX RENEWAL (OUTPATIENT)
Age: 55
End: 2020-09-15

## 2021-02-01 ENCOUNTER — TRANSCRIPTION ENCOUNTER (OUTPATIENT)
Age: 56
End: 2021-02-01

## 2021-02-05 ENCOUNTER — APPOINTMENT (OUTPATIENT)
Dept: INTERNAL MEDICINE | Facility: CLINIC | Age: 56
End: 2021-02-05
Payer: COMMERCIAL

## 2021-02-05 VITALS
OXYGEN SATURATION: 96 % | TEMPERATURE: 95.1 F | HEIGHT: 65.16 IN | SYSTOLIC BLOOD PRESSURE: 106 MMHG | HEART RATE: 112 BPM | DIASTOLIC BLOOD PRESSURE: 72 MMHG | WEIGHT: 210.96 LBS | BODY MASS INDEX: 34.73 KG/M2

## 2021-02-05 PROCEDURE — 99213 OFFICE O/P EST LOW 20 MIN: CPT | Mod: 25

## 2021-02-05 PROCEDURE — 36415 COLL VENOUS BLD VENIPUNCTURE: CPT

## 2021-02-05 PROCEDURE — 99072 ADDL SUPL MATRL&STAF TM PHE: CPT

## 2021-02-05 RX ORDER — SIMVASTATIN 40 MG/1
40 TABLET, FILM COATED ORAL
Qty: 90 | Refills: 3 | Status: DISCONTINUED | COMMUNITY
Start: 2017-02-15 | End: 2021-02-05

## 2021-02-05 RX ORDER — ROSUVASTATIN CALCIUM 40 MG/1
40 TABLET, FILM COATED ORAL
Qty: 90 | Refills: 2 | Status: DISCONTINUED | COMMUNITY
Start: 2020-09-15 | End: 2021-02-05

## 2021-02-06 NOTE — HISTORY OF PRESENT ILLNESS
[FreeTextEntry1] : Patient presents for follow-up of chronic disease management [de-identified] : Has been trying to follow a low-fat diet, recent coronary calcium score reviewed, denies any chest pain dyspnea on exertion while walking denies any lower extremity edema lightheadedness dizziness.  Will be following up with cardiologist has made drastic improvements to diet.

## 2021-02-06 NOTE — ASSESSMENT
[FreeTextEntry1] : Blood work done today, check lipid panel hemoglobin A1c, advised to continue with lifestyle modification moderate activity, advised to stay hydrated for history of kidney stones.

## 2021-02-12 LAB
25(OH)D3 SERPL-MCNC: 30.9 NG/ML
ALBUMIN SERPL ELPH-MCNC: 4.8 G/DL
ALP BLD-CCNC: 79 U/L
ALT SERPL-CCNC: 16 U/L
ANION GAP SERPL CALC-SCNC: 18 MMOL/L
APPEARANCE: ABNORMAL
APPEARANCE: ABNORMAL
AST SERPL-CCNC: 19 U/L
BACTERIA: NEGATIVE
BASOPHILS # BLD AUTO: 0.06 K/UL
BASOPHILS NFR BLD AUTO: 0.8 %
BILIRUB SERPL-MCNC: 0.4 MG/DL
BILIRUBIN URINE: NEGATIVE
BILIRUBIN URINE: NEGATIVE
BLOOD URINE: NORMAL
BLOOD URINE: NORMAL
BUN SERPL-MCNC: 16 MG/DL
CALCIUM SERPL-MCNC: 9.5 MG/DL
CHLORIDE SERPL-SCNC: 97 MMOL/L
CHOLEST SERPL-MCNC: 176 MG/DL
CO2 SERPL-SCNC: 22 MMOL/L
COLOR: ABNORMAL
COLOR: NORMAL
CREAT SERPL-MCNC: 1.23 MG/DL
EOSINOPHIL # BLD AUTO: 0.05 K/UL
EOSINOPHIL NFR BLD AUTO: 0.7 %
ESTIMATED AVERAGE GLUCOSE: 126 MG/DL
GLUCOSE QUALITATIVE U: NEGATIVE
GLUCOSE QUALITATIVE U: NEGATIVE
GLUCOSE SERPL-MCNC: 103 MG/DL
HBA1C MFR BLD HPLC: 6 %
HCT VFR BLD CALC: 48.3 %
HDLC SERPL-MCNC: 40 MG/DL
HGB BLD-MCNC: 15.5 G/DL
HYALINE CASTS: 0 /LPF
IMM GRANULOCYTES NFR BLD AUTO: 0.3 %
KETONES URINE: NEGATIVE
KETONES URINE: NEGATIVE
LDLC SERPL CALC-MCNC: 108 MG/DL
LEUKOCYTE ESTERASE URINE: NEGATIVE
LEUKOCYTE ESTERASE URINE: NEGATIVE
LYMPHOCYTES # BLD AUTO: 1.96 K/UL
LYMPHOCYTES NFR BLD AUTO: 27 %
MAN DIFF?: NORMAL
MCHC RBC-ENTMCNC: 31 PG
MCHC RBC-ENTMCNC: 32.1 GM/DL
MCV RBC AUTO: 96.6 FL
MICROSCOPIC-UA: NORMAL
MONOCYTES # BLD AUTO: 0.47 K/UL
MONOCYTES NFR BLD AUTO: 6.5 %
NEUTROPHILS # BLD AUTO: 4.71 K/UL
NEUTROPHILS NFR BLD AUTO: 64.7 %
NITRITE URINE: NEGATIVE
NITRITE URINE: NEGATIVE
NONHDLC SERPL-MCNC: 136 MG/DL
PH URINE: 6
PH URINE: 6
PLATELET # BLD AUTO: 295 K/UL
POTASSIUM SERPL-SCNC: 3.7 MMOL/L
PROT SERPL-MCNC: 7.7 G/DL
PROTEIN URINE: ABNORMAL
PROTEIN URINE: ABNORMAL
PSA SERPL-MCNC: 0.63 NG/ML
RBC # BLD: 5 M/UL
RBC # FLD: 13.2 %
RED BLOOD CELLS URINE: 2 /HPF
SODIUM SERPL-SCNC: 137 MMOL/L
SPECIFIC GRAVITY URINE: 1.02
SPECIFIC GRAVITY URINE: 1.02
SQUAMOUS EPITHELIAL CELLS: 0 /HPF
TRIGL SERPL-MCNC: 142 MG/DL
TSH SERPL-ACNC: 0.47 UIU/ML
UROBILINOGEN URINE: NORMAL
UROBILINOGEN URINE: NORMAL
WBC # FLD AUTO: 7.27 K/UL
WHITE BLOOD CELLS URINE: 1 /HPF

## 2021-03-19 ENCOUNTER — APPOINTMENT (OUTPATIENT)
Dept: INTERNAL MEDICINE | Facility: CLINIC | Age: 56
End: 2021-03-19
Payer: COMMERCIAL

## 2021-03-19 DIAGNOSIS — N20.0 CALCULUS OF KIDNEY: ICD-10-CM

## 2021-03-19 PROCEDURE — 99072 ADDL SUPL MATRL&STAF TM PHE: CPT

## 2021-03-19 PROCEDURE — 36415 COLL VENOUS BLD VENIPUNCTURE: CPT

## 2021-03-22 ENCOUNTER — APPOINTMENT (OUTPATIENT)
Dept: ENDOCRINOLOGY | Facility: CLINIC | Age: 56
End: 2021-03-22
Payer: COMMERCIAL

## 2021-03-22 PROCEDURE — 99213 OFFICE O/P EST LOW 20 MIN: CPT | Mod: 95

## 2021-03-22 RX ORDER — SIMVASTATIN 80 MG/1
TABLET, FILM COATED ORAL
Refills: 0 | Status: COMPLETED | COMMUNITY
End: 2021-03-22

## 2021-03-22 RX ORDER — QUINAPRIL HYDROCHLORIDE 20 MG/1
20 TABLET, FILM COATED ORAL
Refills: 0 | Status: COMPLETED | COMMUNITY
End: 2021-03-22

## 2021-03-22 RX ORDER — LEVOTHYROXINE SODIUM 0.11 MG/1
112 TABLET ORAL
Refills: 0 | Status: COMPLETED | COMMUNITY
End: 2021-03-22

## 2021-03-23 ENCOUNTER — NON-APPOINTMENT (OUTPATIENT)
Age: 56
End: 2021-03-23

## 2021-03-23 LAB
ALBUMIN SERPL ELPH-MCNC: 4.1 G/DL
ALP BLD-CCNC: 66 U/L
ALT SERPL-CCNC: 18 U/L
ANION GAP SERPL CALC-SCNC: 10 MMOL/L
AST SERPL-CCNC: 19 U/L
BILIRUB SERPL-MCNC: 0.4 MG/DL
BUN SERPL-MCNC: 13 MG/DL
CALCIUM SERPL-MCNC: 9.1 MG/DL
CHLORIDE SERPL-SCNC: 100 MMOL/L
CHOLEST SERPL-MCNC: 124 MG/DL
CK SERPL-CCNC: 84 U/L
CO2 SERPL-SCNC: 26 MMOL/L
CREAT SERPL-MCNC: 0.97 MG/DL
ESTIMATED AVERAGE GLUCOSE: 120 MG/DL
GLUCOSE SERPL-MCNC: 148 MG/DL
HBA1C MFR BLD HPLC: 5.8 %
HDLC SERPL-MCNC: 35 MG/DL
LDLC SERPL CALC-MCNC: 46 MG/DL
NONHDLC SERPL-MCNC: 89 MG/DL
POTASSIUM SERPL-SCNC: 4 MMOL/L
PROT SERPL-MCNC: 6.9 G/DL
SODIUM SERPL-SCNC: 136 MMOL/L
TRIGL SERPL-MCNC: 217 MG/DL

## 2021-03-23 RX ORDER — SIMVASTATIN 40 MG/1
40 TABLET, FILM COATED ORAL
Qty: 90 | Refills: 3 | Status: COMPLETED | COMMUNITY
Start: 2021-02-05 | End: 2021-03-23

## 2021-03-23 NOTE — ASSESSMENT
[FreeTextEntry1] : This is a 55-year-old male with Kallmann syndrome, Hashimoto's thyroiditis, osteoporosis, thyroid nodule, hypertension, hyperlipidemia, vitamin D insufficiency, kidney stone, and prediabetes.\par 1.  Kallmann syndrome\par Diagnosed at the age of 12 after he experienced delayed puberty and short stature.  \par His last IM testosterone dose was February 8, 2021.  He will take IM testosterone and will check blood work for testosterone in 3 weeks.\par PSA was normal in February 2021.\par 2.  Hashimoto's thyroiditis\par TSH was normal in February 2021.\par 3.  Osteoporosis\par Check DEXA.\par He was told to start Prolia in the past but did not take it due to dental work.\par 4.  Thyroid nodule\par Thyroid ultrasound from February 1, 2019 showed 7 x 5 x 6 mm questionable exophytic thyroid nodule inferior to the right lower pole (parathyroid adenoma or lymph node).\par Check thyroid ultrasound.\par 5.  Hyperlipidemia/hypertriglyceridemia\par He is on Crestor 10 mg daily.  He has been following a strict Mediterranean diet high in omega-3.  Start Vascepa 4 g daily for hypertriglyceridemia.\par \par

## 2021-03-23 NOTE — HISTORY OF PRESENT ILLNESS
[Home] : at home, [unfilled] , at the time of the visit. [Medical Office: ([facility])___] : at the medical office located in  [Verbal consent obtained from patient] : the patient, [unfilled] [FreeTextEntry1] : CC: Kallmann syndrome\par This is a 55-year-old male with Kallmann syndrome, Hashimoto's thyroiditis, osteoporosis, thyroid nodule, hypertension, hyperlipidemia, vitamin D insufficiency, kidney stone, and prediabetes.\par He was diagnosed with Kallmann syndrome at the age of 12 after he experienced delayed puberty and short stature.  His older brother was also diagnosed with Kallmann syndrome.  He was started on testosterone replacement.  He is currently on IM testosterone cypionate 1 cc every 3 weeks.  He was on testosterone gel in the past.  His last IM testosterone dose was February 8, 2021.\par He has a history of cryptorchidism status post surgery, anosmia, and history of cleft palate status post repair.\par He stopped testosterone therapy at some point so that he can take Clomid and hCG to induce spermatogenesis.\par He was told to start Prolia in the past but did not take it due to dental work.\par Thyroid ultrasound from February 1, 2019 showed\par 7 x 5 x 6 mm questionable exophytic thyroid nodule inferior to the right lower pole (parathyroid adenoma or lymph node).\par He is on Crestor 10 mg daily.  He has been following a strict Mediterranean diet high in omega-3.\par

## 2021-03-25 ENCOUNTER — TRANSCRIPTION ENCOUNTER (OUTPATIENT)
Age: 56
End: 2021-03-25

## 2021-04-21 ENCOUNTER — APPOINTMENT (OUTPATIENT)
Dept: INTERNAL MEDICINE | Facility: CLINIC | Age: 56
End: 2021-04-21
Payer: COMMERCIAL

## 2021-04-21 PROCEDURE — 99072 ADDL SUPL MATRL&STAF TM PHE: CPT

## 2021-04-21 PROCEDURE — 36415 COLL VENOUS BLD VENIPUNCTURE: CPT

## 2021-04-22 LAB
25(OH)D3 SERPL-MCNC: 31.7 NG/ML
PSA FREE FLD-MCNC: 7 %
PSA FREE SERPL-MCNC: 0.07 NG/ML
PSA SERPL-MCNC: 1.05 NG/ML
T4 FREE SERPL-MCNC: 1.3 NG/DL
TSH SERPL-ACNC: 1.11 UIU/ML

## 2021-04-26 ENCOUNTER — APPOINTMENT (OUTPATIENT)
Dept: ENDOCRINOLOGY | Facility: CLINIC | Age: 56
End: 2021-04-26
Payer: COMMERCIAL

## 2021-04-26 VITALS
SYSTOLIC BLOOD PRESSURE: 129 MMHG | HEART RATE: 74 BPM | TEMPERATURE: 99.5 F | HEIGHT: 66 IN | DIASTOLIC BLOOD PRESSURE: 77 MMHG | OXYGEN SATURATION: 98 % | BODY MASS INDEX: 34.68 KG/M2 | WEIGHT: 215.8 LBS

## 2021-04-26 DIAGNOSIS — E04.1 NONTOXIC SINGLE THYROID NODULE: ICD-10-CM

## 2021-04-26 DIAGNOSIS — R73.03 PREDIABETES.: ICD-10-CM

## 2021-04-26 DIAGNOSIS — E66.9 OBESITY, UNSPECIFIED: ICD-10-CM

## 2021-04-26 DIAGNOSIS — E55.9 VITAMIN D DEFICIENCY, UNSPECIFIED: ICD-10-CM

## 2021-04-26 LAB — GLUCOSE BLDC GLUCOMTR-MCNC: 95

## 2021-04-26 PROCEDURE — 99072 ADDL SUPL MATRL&STAF TM PHE: CPT

## 2021-04-26 PROCEDURE — 99215 OFFICE O/P EST HI 40 MIN: CPT

## 2021-04-28 LAB
TESTOST BND SERPL-MCNC: 9.1 PG/ML
TESTOST SERPL-MCNC: 640.3 NG/DL

## 2021-04-30 PROBLEM — R73.03 PREDIABETES: Status: ACTIVE | Noted: 2018-01-12

## 2021-04-30 PROBLEM — E55.9 VITAMIN D INSUFFICIENCY: Status: ACTIVE | Noted: 2019-01-22

## 2021-04-30 PROBLEM — E66.9 OBESITY (BMI 30-39.9): Status: ACTIVE | Noted: 2021-04-30

## 2021-04-30 PROBLEM — E04.1 THYROID NODULE: Status: ACTIVE | Noted: 2021-03-23

## 2021-04-30 NOTE — HISTORY OF PRESENT ILLNESS
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  His older brother was also diagnosed with Kallmann syndrome.  He was started on testosterone replacement.  He is currently on IM testosterone cypionate 1 cc every 3 weeks.  He was on testosterone gel in the past.  \par He has a history of cryptorchidism status post surgery, anosmia, and history of cleft palate status post repair.\par He stopped testosterone therapy at some point so that he can take Clomid and hCG to induce spermatogenesis.\par He was told to start Prolia in the past but did not take it due to dental work.\par Thyroid ultrasound from February 1, 2019 showed 7 x 5 x 6 mm questionable exophytic thyroid nodule inferior to the right lower pole (parathyroid adenoma or lymph node).\par He is on Crestor 10 mg daily and acetamide 10 mg daily.\par He is on levothyroxine 100 mcg daily.\par Per patient Vascepa is not covered by insurance and he is awaiting appeal.

## 2021-04-30 NOTE — PHYSICAL EXAM
[Alert] : alert [Well Nourished] : well nourished [Healthy Appearance] : healthy appearance [Obese] : obese [No Acute Distress] : no acute distress [Well Developed] : well developed [Normal Voice/Communication] : normal voice communication [Normal Sclera/Conjunctiva] : normal sclera/conjunctiva [No Proptosis] : no proptosis [No Neck Mass] : no neck mass was observed [No LAD] : no lymphadenopathy [Supple] : the neck was supple [Thyroid Not Enlarged] : the thyroid was not enlarged [No Thyroid Nodules] : no palpable thyroid nodules [No Respiratory Distress] : no respiratory distress [Normal Rate] : heart rate was normal [No Stigmata of Cushings Syndrome] : no stigmata of Cushings Syndrome [Normal Gait] : normal gait [No Clubbing, Cyanosis] : no clubbing  or cyanosis of the fingernails [No Involuntary Movements] : no involuntary movements were seen [No Tremors] : no tremors [Normal Affect] : the affect was normal [Normal Insight/Judgement] : insight and judgment were intact [Normal Mood] : the mood was normal [Acanthosis Nigricans] : no acanthosis nigricans

## 2021-05-04 ENCOUNTER — NON-APPOINTMENT (OUTPATIENT)
Age: 56
End: 2021-05-04

## 2021-05-18 ENCOUNTER — NON-APPOINTMENT (OUTPATIENT)
Age: 56
End: 2021-05-18

## 2021-05-18 DIAGNOSIS — Z87.39 PERSONAL HISTORY OF OTHER DISEASES OF THE MUSCULOSKELETAL SYSTEM AND CONNECTIVE TISSUE: ICD-10-CM

## 2021-05-24 ENCOUNTER — APPOINTMENT (OUTPATIENT)
Dept: UROLOGY | Facility: CLINIC | Age: 56
End: 2021-05-24
Payer: COMMERCIAL

## 2021-05-24 DIAGNOSIS — E23.0 HYPOPITUITARISM: ICD-10-CM

## 2021-05-24 DIAGNOSIS — R97.20 ELEVATED PROSTATE, SPECIFIC ANTIGEN [PSA]: ICD-10-CM

## 2021-05-24 DIAGNOSIS — M85.80 OTHER SPECIFIED DISORDERS OF BONE DENSITY AND STRUCTURE, UNSPECIFIED SITE: ICD-10-CM

## 2021-05-24 DIAGNOSIS — R79.89 OTHER SPECIFIED ABNORMAL FINDINGS OF BLOOD CHEMISTRY: ICD-10-CM

## 2021-05-24 PROCEDURE — 88112 CYTOPATH CELL ENHANCE TECH: CPT | Mod: 26

## 2021-05-24 PROCEDURE — 99214 OFFICE O/P EST MOD 30 MIN: CPT

## 2021-05-24 PROCEDURE — 99072 ADDL SUPL MATRL&STAF TM PHE: CPT

## 2021-05-24 NOTE — ASSESSMENT
[FreeTextEntry1] : 05/24/2021: Mr. Yoder is a 56y/o M presenting today for evaluation of rise in PSA. PMHx of Kallmann syndrome. Patient is currently on IM testosterone cypionate 1 cc every two weeks, and notes it was increased from 0.5 cc in the past. Follows endocrinologist Dr. Montiel for this. Had seen Dr. Barraza in the past for kidney stone. Had 1.5 cm kidney stone removed on 3/5/2020. Describes a sign of kidney stone pain as a finger poke on his back. Patient presents today for rise in PSA, which was 1.05 on 4/22/21, increased from 0.63 on 2/5/21. Urination overall is satisfactory. Drinks 6-7 bottles of water a day. Denies dysuria, pushing or straining, urinary urgency. No FHx of prostate cancer. States he lost 10 pounds due to diet and exercise. However, states that after starting Triamterene, has had more difficulty losing weight but has been maintaining. Notes he started Crestor 6 weeks ago. Works as . \par \par Digital rectal exam found no suspicious rectal masses. No rectal mucosal lesions. Anal tone is tight. The prostate is non tender, with normal texture, and no nodules. Borders slightly obscured. It is a 35 gram transurethral resection size. No gross blood on examining fingers. \par \par Patient will schedule for renal US for hx of kidney stone. \par \par Blood work today includes PSA and testosterone. If PSA is above 1.30, we will obtain a prostate MRI. \par The patient produced a urine sample which will be sent for urinalysis, urine cytology, and urine culture. \par \par Patient will schedule a telehealth visit after renal US. \par \par Preparation, in-person office visit, and coordination of care took: 30 minutes

## 2021-05-24 NOTE — ADDENDUM
[FreeTextEntry1] : I, Norma Nolanin, acted solely as a scribe for Dr. Colin Welch on this date 05/24/2021.\par \par All medical record entries made by the Scribe were at my, Dr. Colin Welch, direction and personally dictated by me on 05/24/2021. I have reviewed the chart and agree that the record accurately reflects my personal performance of the history, physical exam, assessment and plan.  I have also personally directed, reviewed and agreed with the chart.

## 2021-05-24 NOTE — HISTORY OF PRESENT ILLNESS
[FreeTextEntry1] : 05/24/2021: Mr. Yoder is a 54y/o M presenting today for evaluation of rise in PSA. PMHx of Kallmann syndrome. Patient is currently on IM testosterone cypionate 1 cc every two weeks, and notes it was increased from 0.5 cc in the past. Follows endocrinologist Dr. Montiel for this. Had seen Dr. Barraza in the past for kidney stone. Had 1.5 cm kidney stone removed on 3/5/2020. Describes a sign of kidney stone pain as a finger poke on his back. Patient presents today for rise in PSA, which was 1.05 on 4/22/21, increased from 0.63 on 2/5/21. Urination overall is satisfactory. Drinks 6-7 bottles of water a day. Denies dysuria, pushing or straining, urinary urgency. No FHx of prostate cancer. States he lost 10 pounds due to diet and exercise. However, states that after starting Triamterene, has had more difficulty losing weight but has been maintaining. Notes he started Crestor 6 weeks ago. Works as .

## 2021-05-24 NOTE — PHYSICAL EXAM
[General Appearance - Well Developed] : well developed [Normal Appearance] : normal appearance [General Appearance - In No Acute Distress] : no acute distress [Abdomen Soft] : soft [Abdomen Tenderness] : non-tender [Edema] : no peripheral edema [] : no respiratory distress [Respiration, Rhythm And Depth] : normal respiratory rhythm and effort [Exaggerated Use Of Accessory Muscles For Inspiration] : no accessory muscle use [Oriented To Time, Place, And Person] : oriented to person, place, and time [Affect] : the affect was normal [Mood] : the mood was normal [Not Anxious] : not anxious [Normal Station and Gait] : the gait and station were normal for the patient's age [No Focal Deficits] : no focal deficits [FreeTextEntry1] : Digital rectal exam found no suspicious rectal masses. No rectal mucosal lesions. Anal tone is tight. The prostate is non tender, with normal texture, and no nodules. Borders slightly obscured. It is a 35 gram transurethral resection size. No gross blood on examining fingers.

## 2021-05-25 LAB
APPEARANCE: CLEAR
BACTERIA: NEGATIVE
BILIRUBIN URINE: NEGATIVE
BLOOD URINE: NEGATIVE
COLOR: YELLOW
GLUCOSE QUALITATIVE U: NEGATIVE
HYALINE CASTS: 1 /LPF
KETONES URINE: NEGATIVE
LEUKOCYTE ESTERASE URINE: NEGATIVE
MICROSCOPIC-UA: NORMAL
NITRITE URINE: NEGATIVE
PH URINE: 6.5
PROTEIN URINE: ABNORMAL
PSA SERPL-MCNC: 1.18 NG/ML
RED BLOOD CELLS URINE: 1 /HPF
SPECIFIC GRAVITY URINE: 1.03
SQUAMOUS EPITHELIAL CELLS: 0 /HPF
UROBILINOGEN URINE: NORMAL
WHITE BLOOD CELLS URINE: 1 /HPF

## 2021-05-26 LAB — BACTERIA UR CULT: NORMAL

## 2021-05-29 LAB
TESTOST BND SERPL-MCNC: 5.8 PG/ML
TESTOST SERPL-MCNC: 328.3 NG/DL
URINE CYTOLOGY: NORMAL

## 2021-06-03 PROBLEM — N20.0 NEPHROLITHIASIS: Status: ACTIVE | Noted: 2021-06-03

## 2021-06-07 ENCOUNTER — NON-APPOINTMENT (OUTPATIENT)
Age: 56
End: 2021-06-07

## 2021-06-09 ENCOUNTER — NON-APPOINTMENT (OUTPATIENT)
Age: 56
End: 2021-06-09

## 2021-08-24 ENCOUNTER — APPOINTMENT (OUTPATIENT)
Dept: UROLOGY | Facility: CLINIC | Age: 56
End: 2021-08-24

## 2021-08-31 ENCOUNTER — TRANSCRIPTION ENCOUNTER (OUTPATIENT)
Age: 56
End: 2021-08-31

## 2021-09-09 ENCOUNTER — TRANSCRIPTION ENCOUNTER (OUTPATIENT)
Age: 56
End: 2021-09-09

## 2021-10-20 ENCOUNTER — TRANSCRIPTION ENCOUNTER (OUTPATIENT)
Age: 56
End: 2021-10-20

## 2021-11-03 ENCOUNTER — TRANSCRIPTION ENCOUNTER (OUTPATIENT)
Age: 56
End: 2021-11-03

## 2021-11-08 ENCOUNTER — TRANSCRIPTION ENCOUNTER (OUTPATIENT)
Age: 56
End: 2021-11-08

## 2021-11-10 RX ORDER — TESTOSTERONE CYPIONATE 200 MG/ML
200 INJECTION, SOLUTION INTRAMUSCULAR
Qty: 1 | Refills: 0 | Status: ACTIVE | COMMUNITY
Start: 2018-01-26 | End: 1900-01-01

## 2021-11-16 ENCOUNTER — TRANSCRIPTION ENCOUNTER (OUTPATIENT)
Age: 56
End: 2021-11-16

## 2021-12-13 ENCOUNTER — TRANSCRIPTION ENCOUNTER (OUTPATIENT)
Age: 56
End: 2021-12-13

## 2022-01-23 ENCOUNTER — TRANSCRIPTION ENCOUNTER (OUTPATIENT)
Age: 57
End: 2022-01-23

## 2022-04-08 ENCOUNTER — TRANSCRIPTION ENCOUNTER (OUTPATIENT)
Age: 57
End: 2022-04-08

## 2022-04-08 DIAGNOSIS — E06.3 AUTOIMMUNE THYROIDITIS: ICD-10-CM

## 2022-04-08 DIAGNOSIS — R97.20 ELEVATED PROSTATE, SPECIFIC ANTIGEN [PSA]: ICD-10-CM

## 2022-04-08 DIAGNOSIS — E03.9 HYPOTHYROIDISM, UNSPECIFIED: ICD-10-CM

## 2022-04-08 DIAGNOSIS — Z11.59 ENCOUNTER FOR SCREENING FOR OTHER VIRAL DISEASES: ICD-10-CM

## 2022-04-11 PROBLEM — Z11.59 SCREENING FOR VIRAL DISEASE: Status: ACTIVE | Noted: 2022-04-08

## 2022-04-22 ENCOUNTER — NON-APPOINTMENT (OUTPATIENT)
Age: 57
End: 2022-04-22

## 2022-04-23 ENCOUNTER — TRANSCRIPTION ENCOUNTER (OUTPATIENT)
Age: 57
End: 2022-04-23

## 2022-05-02 ENCOUNTER — APPOINTMENT (OUTPATIENT)
Dept: INTERNAL MEDICINE | Facility: CLINIC | Age: 57
End: 2022-05-02
Payer: COMMERCIAL

## 2022-05-02 VITALS
SYSTOLIC BLOOD PRESSURE: 147 MMHG | OXYGEN SATURATION: 98 % | HEART RATE: 92 BPM | DIASTOLIC BLOOD PRESSURE: 94 MMHG | HEIGHT: 66 IN | TEMPERATURE: 97.3 F | BODY MASS INDEX: 35.84 KG/M2 | WEIGHT: 223 LBS

## 2022-05-02 DIAGNOSIS — I10 ESSENTIAL (PRIMARY) HYPERTENSION: ICD-10-CM

## 2022-05-02 DIAGNOSIS — N20.0 CALCULUS OF KIDNEY: ICD-10-CM

## 2022-05-02 DIAGNOSIS — E78.1 PURE HYPERGLYCERIDEMIA: ICD-10-CM

## 2022-05-02 DIAGNOSIS — Z00.00 ENCOUNTER FOR GENERAL ADULT MEDICAL EXAMINATION W/OUT ABNORMAL FINDINGS: ICD-10-CM

## 2022-05-02 DIAGNOSIS — E78.5 HYPERLIPIDEMIA, UNSPECIFIED: ICD-10-CM

## 2022-05-02 PROCEDURE — 99396 PREV VISIT EST AGE 40-64: CPT | Mod: 25

## 2022-05-02 PROCEDURE — 36415 COLL VENOUS BLD VENIPUNCTURE: CPT

## 2022-05-02 NOTE — HISTORY OF PRESENT ILLNESS
[FreeTextEntry1] : Patient presents for annual physical exam. [de-identified] : Patient is interested in weight loss. Denies any CP, chest tightness, or SOB\par He notes his diastolic blood pressure is sometimes in 90s.\par Denies any headache, lightheadedness or dizziness.

## 2022-05-02 NOTE — HEALTH RISK ASSESSMENT
[Good] : ~his/her~  mood as  good [FreeTextEntry1] : health maintenance [Never] : Never [No] : No [0] : 2) Feeling down, depressed, or hopeless: Not at all (0)

## 2022-05-02 NOTE — HISTORY OF PRESENT ILLNESS
[FreeTextEntry1] : Patient presents for annual physical exam. [de-identified] : Patient is interested in weight loss. Denies any CP, chest tightness, or SOB\par He notes his diastolic blood pressure is sometimes in 90s.\par Denies any headache, lightheadedness or dizziness.

## 2022-05-02 NOTE — ADDENDUM
[FreeTextEntry1] : I, Ирина Pizano, acted as a scribe on behalf of Dr. Tamir Lin MD, on 05/02/2022. \par \par All medical entries made by the scribe were at my, Dr. Tamir Lin MD, direction and personally dictated by me on 05/02/2022. I have reviewed the chart and agree that the record accurately reflects my personal performance of the history, physical exam, assessment and plan. I have also personally directed, reviewed, and agreed with the chart.

## 2022-05-02 NOTE — ASSESSMENT
[FreeTextEntry1] : Annual Physical Exam\par -Check routine labs and blood work. check lipid panel.\par -Blood pressure is stable.\par -Discussed GLP-1 antagonist, however pt opted to increase activity.\par -Ordered Renal US for kidney stones.\par -RTO annually or as needed

## 2022-05-09 ENCOUNTER — TRANSCRIPTION ENCOUNTER (OUTPATIENT)
Age: 57
End: 2022-05-09

## 2022-05-09 LAB
25(OH)D3 SERPL-MCNC: 24.3 NG/ML
ALBUMIN SERPL ELPH-MCNC: 4.5 G/DL
ALP BLD-CCNC: 65 U/L
ALT SERPL-CCNC: 17 U/L
ANION GAP SERPL CALC-SCNC: 16 MMOL/L
APPEARANCE: CLEAR
APPEARANCE: CLEAR
AST SERPL-CCNC: 20 U/L
BACTERIA: NEGATIVE
BASOPHILS # BLD AUTO: 0.05 K/UL
BASOPHILS NFR BLD AUTO: 0.6 %
BILIRUB SERPL-MCNC: 0.3 MG/DL
BILIRUBIN URINE: NEGATIVE
BILIRUBIN URINE: NEGATIVE
BLOOD URINE: NORMAL
BLOOD URINE: NORMAL
BUN SERPL-MCNC: 9 MG/DL
CALCIUM SERPL-MCNC: 9.2 MG/DL
CHLORIDE SERPL-SCNC: 101 MMOL/L
CHOLEST SERPL-MCNC: 130 MG/DL
CO2 SERPL-SCNC: 24 MMOL/L
COLOR: YELLOW
COLOR: YELLOW
CREAT SERPL-MCNC: 0.93 MG/DL
EGFR: 96 ML/MIN/1.73M2
EOSINOPHIL # BLD AUTO: 0.09 K/UL
EOSINOPHIL NFR BLD AUTO: 1.1 %
ESTIMATED AVERAGE GLUCOSE: 140 MG/DL
GLUCOSE QUALITATIVE U: NORMAL
GLUCOSE QUALITATIVE U: NORMAL
GLUCOSE SERPL-MCNC: 121 MG/DL
HBA1C MFR BLD HPLC: 6.5 %
HCT VFR BLD CALC: 43.4 %
HDLC SERPL-MCNC: 35 MG/DL
HGB BLD-MCNC: 14.5 G/DL
HYALINE CASTS: 0 /LPF
IMM GRANULOCYTES NFR BLD AUTO: 0.5 %
KETONES URINE: NEGATIVE
KETONES URINE: NEGATIVE
LDLC SERPL CALC-MCNC: 53 MG/DL
LEUKOCYTE ESTERASE URINE: NEGATIVE
LEUKOCYTE ESTERASE URINE: NEGATIVE
LYMPHOCYTES # BLD AUTO: 2.25 K/UL
LYMPHOCYTES NFR BLD AUTO: 26.7 %
MAN DIFF?: NORMAL
MCHC RBC-ENTMCNC: 30.2 PG
MCHC RBC-ENTMCNC: 33.4 GM/DL
MCV RBC AUTO: 90.4 FL
MICROSCOPIC-UA: NORMAL
MONOCYTES # BLD AUTO: 0.65 K/UL
MONOCYTES NFR BLD AUTO: 7.7 %
NEUTROPHILS # BLD AUTO: 5.36 K/UL
NEUTROPHILS NFR BLD AUTO: 63.4 %
NITRITE URINE: NEGATIVE
NITRITE URINE: NEGATIVE
NONHDLC SERPL-MCNC: 94 MG/DL
PH URINE: 6.5
PH URINE: 6.5
PLATELET # BLD AUTO: 246 K/UL
POTASSIUM SERPL-SCNC: 4.3 MMOL/L
PROT SERPL-MCNC: 7.2 G/DL
PROTEIN URINE: NORMAL
PROTEIN URINE: NORMAL
PSA SERPL-MCNC: 0.73 NG/ML
RBC # BLD: 4.8 M/UL
RBC # FLD: 12.1 %
RED BLOOD CELLS URINE: 2 /HPF
SODIUM SERPL-SCNC: 142 MMOL/L
SPECIFIC GRAVITY URINE: 1.02
SPECIFIC GRAVITY URINE: 1.02
SQUAMOUS EPITHELIAL CELLS: 0 /HPF
TRIGL SERPL-MCNC: 206 MG/DL
TSH SERPL-ACNC: 1.67 UIU/ML
UROBILINOGEN URINE: NORMAL
UROBILINOGEN URINE: NORMAL
VIT B12 SERPL-MCNC: 493 PG/ML
WBC # FLD AUTO: 8.44 K/UL
WHITE BLOOD CELLS URINE: 1 /HPF

## 2022-05-11 ENCOUNTER — TRANSCRIPTION ENCOUNTER (OUTPATIENT)
Age: 57
End: 2022-05-11

## 2022-06-11 ENCOUNTER — TRANSCRIPTION ENCOUNTER (OUTPATIENT)
Age: 57
End: 2022-06-11

## 2022-06-13 ENCOUNTER — APPOINTMENT (OUTPATIENT)
Dept: ENDOCRINOLOGY | Facility: CLINIC | Age: 57
End: 2022-06-13

## 2022-06-27 ENCOUNTER — TRANSCRIPTION ENCOUNTER (OUTPATIENT)
Age: 57
End: 2022-06-27

## 2022-06-28 ENCOUNTER — TRANSCRIPTION ENCOUNTER (OUTPATIENT)
Age: 57
End: 2022-06-28

## 2022-08-15 ENCOUNTER — TRANSCRIPTION ENCOUNTER (OUTPATIENT)
Age: 57
End: 2022-08-15

## 2022-10-01 ENCOUNTER — RX RENEWAL (OUTPATIENT)
Age: 57
End: 2022-10-01

## 2022-10-10 NOTE — H&P PST ADULT - VENOUS THROMBOEMBOLISM AGE
(1) 41-60 years Quality 111:Pneumonia Vaccination Status For Older Adults: Documentation of medical reason(s) for not administering pneumococcal vaccine (e.g., adverse reaction to vaccine) Quality 130: Documentation Of Current Medications In The Medical Record: Current Medications Documented Detail Level: Detailed Quality 431: Preventive Care And Screening: Unhealthy Alcohol Use - Screening: Patient not identified as an unhealthy alcohol user when screened for unhealthy alcohol use using a systematic screening method Quality 110: Preventive Care And Screening: Influenza Immunization: Influenza Immunization not Administered for Documented Reasons. Quality 226: Preventive Care And Screening: Tobacco Use: Screening And Cessation Intervention: Patient screened for tobacco use and is an ex/non-smoker

## 2022-10-25 ENCOUNTER — APPOINTMENT (OUTPATIENT)
Dept: ENDOCRINOLOGY | Facility: CLINIC | Age: 57
End: 2022-10-25

## 2022-10-26 ENCOUNTER — TRANSCRIPTION ENCOUNTER (OUTPATIENT)
Age: 57
End: 2022-10-26

## 2022-12-14 ENCOUNTER — TRANSCRIPTION ENCOUNTER (OUTPATIENT)
Age: 57
End: 2022-12-14

## 2023-01-23 ENCOUNTER — TRANSCRIPTION ENCOUNTER (OUTPATIENT)
Age: 58
End: 2023-01-23

## 2023-01-23 RX ORDER — LEVOTHYROXINE SODIUM 0.1 MG/1
100 TABLET ORAL
Qty: 90 | Refills: 3 | Status: COMPLETED | COMMUNITY
Start: 2016-12-17 | End: 2024-01-18

## 2023-02-14 ENCOUNTER — TRANSCRIPTION ENCOUNTER (OUTPATIENT)
Age: 58
End: 2023-02-14

## 2023-02-14 RX ORDER — ROSUVASTATIN CALCIUM 40 MG/1
40 TABLET, FILM COATED ORAL
Qty: 90 | Refills: 3 | Status: ACTIVE | COMMUNITY
Start: 2021-11-08 | End: 1900-01-01

## 2023-02-14 RX ORDER — EZETIMIBE 10 MG/1
10 TABLET ORAL
Qty: 30 | Refills: 0 | Status: ACTIVE | COMMUNITY
Start: 2021-02-12 | End: 1900-01-01

## 2023-03-31 ENCOUNTER — TRANSCRIPTION ENCOUNTER (OUTPATIENT)
Age: 58
End: 2023-03-31

## 2023-04-19 ENCOUNTER — TRANSCRIPTION ENCOUNTER (OUTPATIENT)
Age: 58
End: 2023-04-19

## 2023-05-02 ENCOUNTER — APPOINTMENT (OUTPATIENT)
Dept: INTERNAL MEDICINE | Facility: CLINIC | Age: 58
End: 2023-05-02

## 2023-05-18 NOTE — ASU DISCHARGE PLAN (ADULT/PEDIATRIC) - NO SPORTS/GYM DURATION
Her, Bonnie CHOI LPN   Licensed Practical Nurse  Telephone Encounter     Addendum  Encounter Date:  1/24/2023     Addendum           Patient's chart reviewed, please contact to schedule OA colonoscopy with .Patient Preference        Schedule Procedure:   Please Schedule Routine (next available or patient preference)  Procedure: Colonoscopy (51620) with MD preference for bowel prep.   Diagnosis: Colon Cancer Screening Z12.11  Is patient:             Diabetic? No             ANTIPLATELET / ANTICOAGULATION: MEDICATION:  None  Latex allergy: Yes-Hives  Sleep apnea: No  Location: Patient Preference  Sedation: MAC Anesthesia (hx of anxiety taking medications including Xanax)     Special Instructions: None        Covid: Fully Vaccinated  Yes  Immunocompromised:  No                  until cleared by surgeon

## 2024-06-03 ENCOUNTER — APPOINTMENT (OUTPATIENT)
Dept: PULMONOLOGY | Facility: CLINIC | Age: 59
End: 2024-06-03
Payer: COMMERCIAL

## 2024-06-03 VITALS
WEIGHT: 227 LBS | SYSTOLIC BLOOD PRESSURE: 136 MMHG | OXYGEN SATURATION: 97 % | DIASTOLIC BLOOD PRESSURE: 86 MMHG | HEART RATE: 105 BPM | BODY MASS INDEX: 36.64 KG/M2 | TEMPERATURE: 96.4 F

## 2024-06-03 DIAGNOSIS — R05.3 CHRONIC COUGH: ICD-10-CM

## 2024-06-03 PROCEDURE — 99203 OFFICE O/P NEW LOW 30 MIN: CPT

## 2024-06-03 RX ORDER — PREDNISONE 10 MG/1
10 TABLET ORAL DAILY
Qty: 14 | Refills: 0 | Status: ACTIVE | COMMUNITY
Start: 2024-06-03 | End: 1900-01-01

## 2024-06-03 RX ORDER — ICOSAPENT ETHYL 1000 MG/1
1 CAPSULE ORAL
Qty: 360 | Refills: 2 | Status: DISCONTINUED | COMMUNITY
Start: 2021-03-22 | End: 2024-06-03

## 2024-06-03 RX ORDER — CHLORTHALIDONE 25 MG/1
25 TABLET ORAL
Refills: 0 | Status: ACTIVE | COMMUNITY
Start: 2024-06-03

## 2024-06-03 RX ORDER — RAMIPRIL 10 MG/1
10 CAPSULE ORAL DAILY
Refills: 0 | Status: ACTIVE | COMMUNITY
Start: 2024-06-03

## 2024-06-03 RX ORDER — PROMETHAZINE HYDROCHLORIDE AND DEXTROMETHORPHAN HYDROBROMIDE ORAL SOLUTION 15; 6.25 MG/5ML; MG/5ML
6.25-15 SOLUTION ORAL TWICE DAILY
Qty: 1 | Refills: 2 | Status: ACTIVE | COMMUNITY
Start: 2024-06-03 | End: 1900-01-01

## 2024-06-03 RX ORDER — TRIAMTERENE AND HYDROCHLOROTHIAZIDE 25; 37.5 MG/1; MG/1
37.5-25 TABLET ORAL
Qty: 90 | Refills: 3 | Status: DISCONTINUED | COMMUNITY
Start: 2022-05-02 | End: 2024-06-03

## 2024-06-03 RX ORDER — ALBUTEROL SULFATE 90 UG/1
108 (90 BASE) INHALANT RESPIRATORY (INHALATION)
Qty: 1 | Refills: 2 | Status: ACTIVE | COMMUNITY
Start: 2024-06-03 | End: 1900-01-01

## 2024-06-03 RX ORDER — QUINAPRIL HYDROCHLORIDE 40 MG/1
40 TABLET, FILM COATED ORAL
Qty: 90 | Refills: 3 | Status: DISCONTINUED | COMMUNITY
Start: 2017-09-15 | End: 2024-06-03

## 2024-06-03 RX ORDER — FLUTICASONE PROPIONATE AND SALMETEROL 250; 50 UG/1; UG/1
250-50 POWDER RESPIRATORY (INHALATION)
Qty: 1 | Refills: 2 | Status: ACTIVE | COMMUNITY
Start: 2024-06-03 | End: 1900-01-01

## 2024-06-03 RX ORDER — CHOLECALCIFEROL (VITAMIN D3) 50 MCG
50 MCG TABLET ORAL
Qty: 90 | Refills: 0 | Status: DISCONTINUED | COMMUNITY
Start: 2019-01-22 | End: 2024-06-03

## 2024-06-03 NOTE — PHYSICAL EXAM
[Well Nourished] : well nourished [Well Developed] : well developed [Enlarged Base of the Tongue] : enlarged base of the tongue [III] : Mallampati Class: III [3+] : Right Tonsil: 3+ [Normal Rate/Rhythm] : normal rate/rhythm [Normal S1, S2] : normal s1, s2 [No Murmurs] : no murmurs [No Resp Distress] : no resp distress [Clear to Auscultation Bilaterally] : clear to auscultation bilaterally [Benign] : benign [Not Tender] : not tender [No Masses] : no masses [No Clubbing] : no clubbing [No Edema] : no edema [No Focal Deficits] : no focal deficits [Oriented x3] : oriented x3 [Normal Affect] : normal affect [TextBox_2] : elevated BMI [TextBox_44] : short [TextBox_68] : no wheeze

## 2024-06-03 NOTE — DISCUSSION/SUMMARY
[FreeTextEntry1] : 58 year old man with elevated BMI. He has developed hacking cough last several days.  He is on an ACE inhibitor.    He has mild allergies.   Lungs are clear on examination. Tested last year, allergic to elm tree, cockroach, animal dander  He states he has had lung nodules on CXR about 10 years ago, may have been exposed to TB, unclear  PLAN obtain CXR trial ICS LABA  He did not tolerate  prednisone  may try 10 mg prednisone if tolerated  PFT next visit Total time spent : 40 minutes Including: Preparation prior to visit - Reviewing prior record, results of tests and Consultation Reports as applicable Conducting an appropriate H & P during today's encounter  reviewing all available CT chest exams.  demonstrating images to pt as appropriate Counseling patient  Note completion  med renewal discussing differential diagnosis    Jaiden Warner MD

## 2024-06-03 NOTE — HISTORY OF PRESENT ILLNESS
[Former] : former [Never] : never [TextBox_4] : 58 year old patient presents for evaluation of cough  He had been in [employer] 1 week ago.  He came back about 5 days ago. He started to develop cough thereafter.  COVID negative.  he was exposed to smoke from [employer].  Saw NP who performed COVID and strep testing that was negative.  he was given albuterol and prednisone 20 mg. He only took 1 dose.  He denies shortness of breath  Cough is episodic, he has "cracking" of voice.   He has been told that he may have obstructive sleep apnea , by a PA in hospital prior to a stress test.  overnight sleep study was recommended   Primary doctor is  Dr. Sherman Gonsalez     PSH:  Kallman syndrome, palate surgery    no cough in past   PMH:    hypothyroid  elevated PSA   history of hashimoto thyroiditis  HTN  HLD on meds daytime somnolence Kallman syndrome  anosmia, undescended syndrome  cleft palate  preDM renal stone  SH:     former smoker  in past intermittent       ETOH: no     Occupation: legal [employer]      ALLERGY:  amlodipine Clindamycin Clindamycin HCl CAPS  [employer]iance   environmental/seasonal allergy:  some  recently       Review of Systems:   No rash, skin problems No dry eyes no mouth ulcers no sinusitis, sinus infections, nasal obstruction no dysphagia no dry mouth   no arthritis no joint aches no joint swelling    no obstructive airway disease  no pneumonia no wheeze no lung cancer   no CAD no MI no chest pain no murmur no CHF  no edema   has GERD no abdominal pain no liver disease     no bleeding   no DVT or PE   no kidney disease   no stroke no seizure                         [TextBox_11] : 1 [TextBox_13] : 10 [YearQuit] : 2004 [TextBox_22] : smoked intermittently

## 2024-06-11 ENCOUNTER — APPOINTMENT (OUTPATIENT)
Dept: PULMONOLOGY | Facility: CLINIC | Age: 59
End: 2024-06-11

## 2024-07-15 ENCOUNTER — APPOINTMENT (OUTPATIENT)
Dept: PULMONOLOGY | Facility: CLINIC | Age: 59
End: 2024-07-15